# Patient Record
Sex: MALE | Race: BLACK OR AFRICAN AMERICAN | NOT HISPANIC OR LATINO
[De-identification: names, ages, dates, MRNs, and addresses within clinical notes are randomized per-mention and may not be internally consistent; named-entity substitution may affect disease eponyms.]

---

## 2020-03-04 PROBLEM — Z00.00 ENCOUNTER FOR PREVENTIVE HEALTH EXAMINATION: Status: ACTIVE | Noted: 2020-03-04

## 2020-03-06 ENCOUNTER — APPOINTMENT (OUTPATIENT)
Dept: VASCULAR SURGERY | Facility: CLINIC | Age: 35
End: 2020-03-06

## 2020-12-11 ENCOUNTER — APPOINTMENT (OUTPATIENT)
Dept: VASCULAR SURGERY | Facility: CLINIC | Age: 35
End: 2020-12-11

## 2021-12-23 ENCOUNTER — EMERGENCY (EMERGENCY)
Facility: HOSPITAL | Age: 36
LOS: 1 days | Discharge: HOME | End: 2021-12-23
Attending: EMERGENCY MEDICINE | Admitting: EMERGENCY MEDICINE
Payer: MEDICAID

## 2021-12-23 VITALS
OXYGEN SATURATION: 97 % | TEMPERATURE: 98 F | DIASTOLIC BLOOD PRESSURE: 94 MMHG | HEART RATE: 86 BPM | SYSTOLIC BLOOD PRESSURE: 158 MMHG | RESPIRATION RATE: 18 BRPM

## 2021-12-23 VITALS
DIASTOLIC BLOOD PRESSURE: 95 MMHG | HEART RATE: 86 BPM | TEMPERATURE: 98 F | WEIGHT: 315 LBS | HEIGHT: 72 IN | RESPIRATION RATE: 18 BRPM | OXYGEN SATURATION: 97 % | SYSTOLIC BLOOD PRESSURE: 155 MMHG
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
## 2021-12-23 DIAGNOSIS — S81.831A PUNCTURE WOUND WITHOUT FOREIGN BODY, RIGHT LOWER LEG, INITIAL ENCOUNTER: ICD-10-CM
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
## 2021-12-23 DIAGNOSIS — I83.018 VARICOSE VEINS OF RIGHT LOWER EXTREMITY WITH ULCER OTHER PART OF LOWER LEG: ICD-10-CM

## 2021-12-23 DIAGNOSIS — Z91.013 ALLERGY TO SEAFOOD: ICD-10-CM

## 2021-12-23 PROCEDURE — 12001 RPR S/N/AX/GEN/TRNK 2.5CM/<: CPT

## 2021-12-23 PROCEDURE — 99282 EMERGENCY DEPT VISIT SF MDM: CPT | Mod: 25

## 2021-12-23 PROCEDURE — 99283 EMERGENCY DEPT VISIT LOW MDM: CPT | Mod: 25

## 2021-12-23 NOTE — ED PROVIDER NOTE - CLINICAL SUMMARY MEDICAL DECISION MAKING FREE TEXT BOX
Speaking Coherently 37 y/o M presents to ED with recurred superficial varicose vein bleed due to superficial injury of skin. Bleeding was unable to be controlled with direct pressure so 1 stitch was placed. Pt with good hemostasis and good neurovascular status.

## 2021-12-23 NOTE — ED PROVIDER NOTE - CARE PROVIDER_API CALL
Jennifer Dasilva (MD)  Surgery; Vascular Surgery  [address], [address] [place] [address]  Phone: [phone]  Fax: [phone]  Follow Up Time: 4-6 Days

## 2021-12-23 NOTE — ED ADULT TRIAGE NOTE - CHIEF COMPLAINT QUOTE
Pt presents to ED after right lower leg began bleeding this morning. Pt states this happened before when a varicose vein ruptured. EMS and pt endorses "a lot of blood squirting blood from my leg." EMS states active significant bleeding until they applied pressure dressing. pressure dressing in place on arrival, not removed to visualize. No active bleeding.

## 2021-12-23 NOTE — ED PROVIDER NOTE - NSFOLLOWUPINSTRUCTIONS_ED_ALL_ED_FT
You have a stitch that will need to be removed in 14 days.  Return to ER for additional bleeding, pain or swelling to area.  Additionally monitor for any infection including redness, discharge, fever, chills, pain.  You need to see a vascular doctor as soon as possible.

## 2021-12-23 NOTE — ED PROVIDER NOTE - PATIENT PORTAL LINK FT
You can access the FollowMyHealth Patient Portal offered by [facility] by registering at the following website: [email or web address]. By joining [employer]’s FollowMyHealth portal, you will also be able to view your health information using other applications (apps) compatible with our system.

## 2021-12-23 NOTE — ED PROVIDER NOTE - SKIN, MLM
Skin normal color for race, warm, and dry. No evidence of rash. Venous pressurized wound with bleeding to right distal, lateral lower leg. Unable to control bleeding by direct pressure. Nonpulsatile.

## 2021-12-23 NOTE — ED PROVIDER NOTE - OBJECTIVE STATEMENT
37 y/o M with PMHx asthma and varicose veins presents to ED with bleeding to his right leg. Pt states he brushed his leg against something around 7-7:30 this morning. One of the varicose veins in his right leg ruptured and began to bleed. Pt put pressure on the wound but the bleeding persisted. Denies any dizziness. Pt states this has happened before and the wound was closed with glue. Pt has not seen vascular at this time.

## 2021-12-23 NOTE — ED PROVIDER NOTE - MUSCULOSKELETAL, MLM
Spine appears normal, range of motion is not limited, no muscle or joint tenderness. Strong 2+ distal DP/PT pulses with warm and well perfused foot and toes. Cap refill less than 2 seconds.

## 2022-01-03 PROBLEM — J45.909 UNSPECIFIED ASTHMA, UNCOMPLICATED: Chronic | Status: ACTIVE | Noted: 2021-12-23

## 2022-01-26 ENCOUNTER — APPOINTMENT (OUTPATIENT)
Age: 37
End: 2022-01-26
Payer: MEDICAID

## 2022-01-26 VITALS
DIASTOLIC BLOOD PRESSURE: 95 MMHG | SYSTOLIC BLOOD PRESSURE: 130 MMHG | WEIGHT: 315 LBS | HEART RATE: 67 BPM | HEIGHT: 72 IN | BODY MASS INDEX: 42.66 KG/M2

## 2022-01-26 DIAGNOSIS — E66.01 MORBID (SEVERE) OBESITY DUE TO EXCESS CALORIES: ICD-10-CM

## 2022-01-26 DIAGNOSIS — Z78.9 OTHER SPECIFIED HEALTH STATUS: ICD-10-CM

## 2022-01-26 DIAGNOSIS — R06.9 UNSPECIFIED ABNORMALITIES OF BREATHING: ICD-10-CM

## 2022-01-26 PROCEDURE — 36471 NJX SCLRSNT MLT INCMPTNT VN: CPT | Mod: RT

## 2022-01-26 PROCEDURE — 93970 EXTREMITY STUDY: CPT

## 2022-01-26 PROCEDURE — 99203 OFFICE O/P NEW LOW 30 MIN: CPT | Mod: 25

## 2022-01-31 NOTE — ADDENDUM
[FreeTextEntry1] : I, Dr. Roshan Luis, personally performed the evaluation and management (E/M) services for this new patient.  That E/M includes conducting the initial examination, assessing all conditions, and establishing the plan of care.  Today, my ACP, Fadumo King NP, was here to observe my evaluation and management services for this patient to be followed going forward.

## 2022-01-31 NOTE — PROCEDURE
[FreeTextEntry1] : Venous duplex bilaterally ordered to r/o insufficiency, shows: negative SVT/SVT. No deep reflux. No GSV reflux.\par \par \par Procedure performed: Sclerotherapy\par Indications: bleeding varicose vein RLE\par Procedure: Consent was obtained. Patient was placed in comfortable position, legs were prepped with alcohol, 1% sotradecal mixed with normal saline was injected into the veins and pressure dressing with cotton balls and tape was applied. Patient tolerated the procedure well. Legs were wrapped with ACE bandage, patient advised to leave bandage on for 24 hours. FU in 2-3 weeks.

## 2022-01-31 NOTE — HISTORY OF PRESENT ILLNESS
[FreeTextEntry1] : 37 y/o M who presents for evaluation of bleeding varicose vein. He has a PMHx of obesity and breathing problems. He reports having 2 episodes of bleeding varicose veins. The most recent one was a few weeks ago for which he had to call an ambulance and he was transported here to [facility] ER. He mentions this last time, he had stitched in the area and was advised to see a vascular surgeon. The stitches were removed last week. Denies any wounds, previous varicose veins treatments, bleeding or clotting disorders. He does not wear compression stockings. He stands for prorogued periods of time, working as a lott. He tries to stay active and walk daily. OVer the last year, he has lost a ~15-20 lbs intentionally. \par \par \par \par SHx:\par -Works as a lott\par -Stay active\par -Active smoker

## 2022-01-31 NOTE — CONSULT LETTER
[Dear  ___] : Dear  [unfilled], [FreeTextEntry2] : Willie Morillo MD\par [address]\[place] [address] [FreeTextEntry1] : I saw Mr Joselin Berger in my office for evaluation. He has had 2 recent episodes of hemorrhage from varicose veins.  The most recent was 2 weeks ago.  He had to visit in the emergency room to require a skin suture to stop the bleeding.  The suture was subsequently removed.  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 Denies any leg wounds, bleeding or clotting disorders. He has been loosing weight over the last year on purpose and stays active daily.\par \par On exam he is a very friendly and articulate man.  He has mild to moderate distal edema with pendulous thighs. Scattered varices on both thighs and calves. No open wounds. Skin dry.  He is a very large man, 6 foot and 340 pounds and an estimated BMI of 46.\par \par Venous duplex demonstrated no evidence of thrombosis, deep or superficial reflux bilaterally. \par \par During the visit, I performed sclerotherapy on the varices from which he bled this last time to prevent another bleed. I recommended him to continue with weight loss measures, keep skin well moisturized, stay active and wear compression stockings daily. I will see him again in 2-3 weeks to assess the area treated. \par \par My complete EMR office note is below for your records.  [FreeTextEntry3] : Sincerely, \par \par Roshan Luis M.D. \par , [facility]\par , Department of Surgery [facility] [place] [facility]\par Professor of Surgery, [employer] at [facility]

## 2022-01-31 NOTE — PHYSICAL EXAM
[Respiratory Effort] : normal respiratory effort [Normal Rate and Rhythm] : normal rate and rhythm [2+] : left 2+ [No Rash or Lesion] : No rash or lesion [Alert] : alert [Oriented to Person] : oriented to person [Oriented to Place] : oriented to place [Oriented to Time] : oriented to time [Calm] : calm [Ankle Swelling (On Exam)] : present [Ankle Swelling Bilaterally] : bilaterally  [Varicose Veins Of Lower Extremities] : bilaterally [Ankle Swelling On The Left] : moderate [] : not present [de-identified] : WN/WD [FreeTextEntry1] : LEs with mild to moderate distal edema. Scattered, superficial varices on both thighs and calves.  [de-identified] : obese body habitus [de-identified] : FROM

## 2022-01-31 NOTE — ASSESSMENT
[Arterial/Venous Disease] : arterial/venous disease [Smoking Cessation] : smoking cessation [FreeTextEntry1] : 37 y/o M w/ bleeding varicose veins (2 episodes). Most recent episode a couple of weeks ago. On exam, LEs with mild to moderate distal edema. Scattered, superficial varices on both thighs and calves. Obese body habitus. Venous duplex confirmed no evidence of GSV or deep reflux bilaterally. Sclerotherapy performed on most recent bleeding varicose veins with assistance of ultrasound. Pt advised to keep dressing for 24 hours and see us in 2-3 weeks. Prescription for compression stockings and villagomez application device also provided. Pt advised to stay active, continue with weight loss measures and keep skin moisturized.

## 2022-02-09 ENCOUNTER — APPOINTMENT (OUTPATIENT)
Dept: VASCULAR SURGERY | Facility: CLINIC | Age: 37
End: 2022-02-09
Payer: MEDICAID

## 2022-02-09 ENCOUNTER — TRANSCRIPTION ENCOUNTER (OUTPATIENT)
Age: 37
End: 2022-02-09

## 2022-02-09 VITALS
HEIGHT: 72 IN | WEIGHT: 315 LBS | BODY MASS INDEX: 42.66 KG/M2 | HEART RATE: 87 BPM | SYSTOLIC BLOOD PRESSURE: 151 MMHG | DIASTOLIC BLOOD PRESSURE: 91 MMHG

## 2022-02-09 DIAGNOSIS — I83.893 VARICOSE VEINS OF BILATERAL LOWER EXTREMITIES WITH OTHER COMPLICATIONS: ICD-10-CM

## 2022-02-09 DIAGNOSIS — F17.200 NICOTINE DEPENDENCE, UNSPECIFIED, UNCOMPLICATED: ICD-10-CM

## 2022-02-09 DIAGNOSIS — Z98.890 OTHER SPECIFIED POSTPROCEDURAL STATES: ICD-10-CM

## 2022-02-09 DIAGNOSIS — I83.899 VARICOSE VEINS OF UNSPECIFIED LOWER EXTREMITY WITH OTHER COMPLICATIONS: ICD-10-CM

## 2022-02-09 DIAGNOSIS — Z86.79 OTHER SPECIFIED POSTPROCEDURAL STATES: ICD-10-CM

## 2022-02-09 PROCEDURE — 99213 OFFICE O/P EST LOW 20 MIN: CPT

## 2022-02-26 NOTE — HISTORY OF PRESENT ILLNESS
[FreeTextEntry1] : 37 y/o M who presents for followup eval of bleeding varicose vein s/p sclero 1/26/22. He has a PMHx of obesity and breathing problems. He has had 2 episodes of bleeding varicose veins, most recent now ~1 month ago. During the last appt, venous duplex confirmed no evidence of GSV reflux. Sclero was done on the most recent bleeding vv. Today, he presents for f/u eval. Denies any wounds, worsening swelling, redness or pain on sclero site. He has been having some trouble getting the compression socks and they have sent him to a couple of different medical/surgical supplies stores. He is trying to stay active and walk daily. \par \par Over the last year, he has lost a ~15-20 lbs intentionally. \par \par \par SHx:\par -Works as a lott\par -Stay active\par -Active smoker

## 2022-02-26 NOTE — ASSESSMENT
[Arterial/Venous Disease] : arterial/venous disease [Smoking Cessation] : smoking cessation [Foot care/Footwear] : foot care/footwear [FreeTextEntry1] : 35 y/o M w/ bleeding varicose veins (2 episodes). Most recent episode a ~1 month ago s/p sclero R lateral distla ankle 1/26/22. On exam, LEs with mild to moderate distal edema. Scattered, superficial varices on both thighs and calves. Site of sclerotherapy without sings of phlebitis and skin intact. Obese body habitus. Reviewed previous venous duplex. Pt advised to stay active, continue with weight loss measures, daily use of compression stockings and keep skin moisturized. F/u PRN.

## 2022-02-26 NOTE — DATA REVIEWED
[FreeTextEntry1] : 1/26/22 Venous duplex bilateral LEs negative SVT/SVT. No deep reflux. No GSV reflux.

## 2022-02-26 NOTE — ADDENDUM
[FreeTextEntry1] : I, Dr. Roshan Luis, personally performed the evaluation and management (E/M) services for this established patient who presents today with (a) new problem(s)/exacerbation of (an) existing condition(s).  That E/M includes conducting the examination, assessing all new/exacerbated conditions, and establishing a new plan of care.  Today, my ACP, Fadumo King NP, was here to observe my evaluation and management services for this new problem/exacerbated condition to be followed going forward.

## 2024-08-07 NOTE — ED ADULT NURSE NOTE - OBJECTIVE STATEMENT
1 Patient reporting right lower leg varicose vein started "shooting blood around 7/730am today.  "I just brushed up against something."  Patient reporting this has happened before, but not for a few years.  Reports only medical Hx asthma, denies taking any blood thinning medications.  Dressing in place, bleeding controlled.